# Patient Record
Sex: MALE | Race: WHITE | NOT HISPANIC OR LATINO | Employment: FULL TIME | URBAN - METROPOLITAN AREA
[De-identification: names, ages, dates, MRNs, and addresses within clinical notes are randomized per-mention and may not be internally consistent; named-entity substitution may affect disease eponyms.]

---

## 2023-06-23 ENCOUNTER — OFFICE VISIT (OUTPATIENT)
Dept: PRIMARY CARE | Facility: CLINIC | Age: 33
End: 2023-06-23
Payer: COMMERCIAL

## 2023-06-23 ENCOUNTER — LAB (OUTPATIENT)
Dept: LAB | Facility: LAB | Age: 33
End: 2023-06-23
Payer: COMMERCIAL

## 2023-06-23 VITALS
HEIGHT: 67 IN | SYSTOLIC BLOOD PRESSURE: 117 MMHG | HEART RATE: 74 BPM | WEIGHT: 177.5 LBS | OXYGEN SATURATION: 96 % | TEMPERATURE: 98.5 F | BODY MASS INDEX: 27.86 KG/M2 | RESPIRATION RATE: 16 BRPM | DIASTOLIC BLOOD PRESSURE: 76 MMHG

## 2023-06-23 DIAGNOSIS — Z13.220 LIPID SCREENING: ICD-10-CM

## 2023-06-23 DIAGNOSIS — Z00.00 ROUTINE GENERAL MEDICAL EXAMINATION AT A HEALTH CARE FACILITY: ICD-10-CM

## 2023-06-23 DIAGNOSIS — Z00.00 ROUTINE GENERAL MEDICAL EXAMINATION AT A HEALTH CARE FACILITY: Primary | ICD-10-CM

## 2023-06-23 DIAGNOSIS — R73.9 HYPERGLYCEMIA: Primary | ICD-10-CM

## 2023-06-23 PROBLEM — M99.02 SOMATIC DYSFUNCTION OF THORACIC REGION: Status: RESOLVED | Noted: 2023-06-23 | Resolved: 2023-06-23

## 2023-06-23 PROBLEM — J30.1 SEASONAL ALLERGIC RHINITIS DUE TO POLLEN: Status: ACTIVE | Noted: 2023-06-23

## 2023-06-23 PROBLEM — S81.811A LEG LACERATION, RIGHT, INITIAL ENCOUNTER: Status: RESOLVED | Noted: 2023-06-23 | Resolved: 2023-06-23

## 2023-06-23 PROBLEM — M99.07 SOMATIC DYSFUNCTION OF UPPER EXTREMITY: Status: RESOLVED | Noted: 2023-06-23 | Resolved: 2023-06-23

## 2023-06-23 PROBLEM — M99.05 SOMATIC DYSFUNCTION OF PELVIS REGION: Status: RESOLVED | Noted: 2023-06-23 | Resolved: 2023-06-23

## 2023-06-23 PROBLEM — S39.012A STRAIN OF LUMBAR PARASPINAL MUSCLE: Status: RESOLVED | Noted: 2023-06-23 | Resolved: 2023-06-23

## 2023-06-23 PROBLEM — M99.01 SOMATIC DYSFUNCTION OF CERVICAL REGION: Status: RESOLVED | Noted: 2023-06-23 | Resolved: 2023-06-23

## 2023-06-23 PROBLEM — M99.03 SOMATIC DYSFUNCTION OF LUMBAR REGION: Status: RESOLVED | Noted: 2023-06-23 | Resolved: 2023-06-23

## 2023-06-23 LAB
ALANINE AMINOTRANSFERASE (SGPT) (U/L) IN SER/PLAS: 25 U/L (ref 10–52)
ALBUMIN (G/DL) IN SER/PLAS: 4.9 G/DL (ref 3.4–5)
ALKALINE PHOSPHATASE (U/L) IN SER/PLAS: 53 U/L (ref 33–120)
ANION GAP IN SER/PLAS: 10 MMOL/L (ref 10–20)
ASPARTATE AMINOTRANSFERASE (SGOT) (U/L) IN SER/PLAS: 26 U/L (ref 9–39)
BILIRUBIN TOTAL (MG/DL) IN SER/PLAS: 0.4 MG/DL (ref 0–1.2)
CALCIUM (MG/DL) IN SER/PLAS: 9.8 MG/DL (ref 8.6–10.3)
CARBON DIOXIDE, TOTAL (MMOL/L) IN SER/PLAS: 31 MMOL/L (ref 21–32)
CHLORIDE (MMOL/L) IN SER/PLAS: 102 MMOL/L (ref 98–107)
CHOLESTEROL (MG/DL) IN SER/PLAS: 244 MG/DL (ref 0–199)
CHOLESTEROL IN HDL (MG/DL) IN SER/PLAS: 43.4 MG/DL
CHOLESTEROL/HDL RATIO: 5.6
CREATININE (MG/DL) IN SER/PLAS: 0.84 MG/DL (ref 0.5–1.3)
ERYTHROCYTE DISTRIBUTION WIDTH (RATIO) BY AUTOMATED COUNT: 11.6 % (ref 11.5–14.5)
ERYTHROCYTE MEAN CORPUSCULAR HEMOGLOBIN CONCENTRATION (G/DL) BY AUTOMATED: 33.8 G/DL (ref 32–36)
ERYTHROCYTE MEAN CORPUSCULAR VOLUME (FL) BY AUTOMATED COUNT: 88 FL (ref 80–100)
ERYTHROCYTES (10*6/UL) IN BLOOD BY AUTOMATED COUNT: 5.15 X10E12/L (ref 4.5–5.9)
GFR MALE: >90 ML/MIN/1.73M2
GLUCOSE (MG/DL) IN SER/PLAS: 102 MG/DL (ref 74–99)
HEMATOCRIT (%) IN BLOOD BY AUTOMATED COUNT: 45.5 % (ref 41–52)
HEMOGLOBIN (G/DL) IN BLOOD: 15.4 G/DL (ref 13.5–17.5)
LDL: 170 MG/DL (ref 0–99)
LEUKOCYTES (10*3/UL) IN BLOOD BY AUTOMATED COUNT: 6.4 X10E9/L (ref 4.4–11.3)
PLATELETS (10*3/UL) IN BLOOD AUTOMATED COUNT: 238 X10E9/L (ref 150–450)
POTASSIUM (MMOL/L) IN SER/PLAS: 3.9 MMOL/L (ref 3.5–5.3)
PROTEIN TOTAL: 7.3 G/DL (ref 6.4–8.2)
SODIUM (MMOL/L) IN SER/PLAS: 139 MMOL/L (ref 136–145)
TRIGLYCERIDE (MG/DL) IN SER/PLAS: 153 MG/DL (ref 0–149)
UREA NITROGEN (MG/DL) IN SER/PLAS: 10 MG/DL (ref 6–23)
VLDL: 31 MG/DL (ref 0–40)

## 2023-06-23 PROCEDURE — 80061 LIPID PANEL: CPT

## 2023-06-23 PROCEDURE — 1036F TOBACCO NON-USER: CPT | Performed by: STUDENT IN AN ORGANIZED HEALTH CARE EDUCATION/TRAINING PROGRAM

## 2023-06-23 PROCEDURE — 85027 COMPLETE CBC AUTOMATED: CPT

## 2023-06-23 PROCEDURE — 36415 COLL VENOUS BLD VENIPUNCTURE: CPT

## 2023-06-23 PROCEDURE — 99395 PREV VISIT EST AGE 18-39: CPT | Performed by: STUDENT IN AN ORGANIZED HEALTH CARE EDUCATION/TRAINING PROGRAM

## 2023-06-23 PROCEDURE — 80053 COMPREHEN METABOLIC PANEL: CPT

## 2023-06-23 SDOH — ECONOMIC STABILITY: FOOD INSECURITY: WITHIN THE PAST 12 MONTHS, YOU WORRIED THAT YOUR FOOD WOULD RUN OUT BEFORE YOU GOT MONEY TO BUY MORE.: NEVER TRUE

## 2023-06-23 SDOH — ECONOMIC STABILITY: HOUSING INSECURITY
IN THE LAST 12 MONTHS, WAS THERE A TIME WHEN YOU DID NOT HAVE A STEADY PLACE TO SLEEP OR SLEPT IN A SHELTER (INCLUDING NOW)?: NO

## 2023-06-23 SDOH — HEALTH STABILITY: PHYSICAL HEALTH: ON AVERAGE, HOW MANY MINUTES DO YOU ENGAGE IN EXERCISE AT THIS LEVEL?: 30 MIN

## 2023-06-23 SDOH — ECONOMIC STABILITY: FOOD INSECURITY: WITHIN THE PAST 12 MONTHS, THE FOOD YOU BOUGHT JUST DIDN'T LAST AND YOU DIDN'T HAVE MONEY TO GET MORE.: NEVER TRUE

## 2023-06-23 SDOH — ECONOMIC STABILITY: TRANSPORTATION INSECURITY
IN THE PAST 12 MONTHS, HAS THE LACK OF TRANSPORTATION KEPT YOU FROM MEDICAL APPOINTMENTS OR FROM GETTING MEDICATIONS?: NO

## 2023-06-23 SDOH — ECONOMIC STABILITY: INCOME INSECURITY: IN THE LAST 12 MONTHS, WAS THERE A TIME WHEN YOU WERE NOT ABLE TO PAY THE MORTGAGE OR RENT ON TIME?: NO

## 2023-06-23 SDOH — ECONOMIC STABILITY: TRANSPORTATION INSECURITY
IN THE PAST 12 MONTHS, HAS LACK OF TRANSPORTATION KEPT YOU FROM MEETINGS, WORK, OR FROM GETTING THINGS NEEDED FOR DAILY LIVING?: NO

## 2023-06-23 SDOH — HEALTH STABILITY: PHYSICAL HEALTH: ON AVERAGE, HOW MANY DAYS PER WEEK DO YOU ENGAGE IN MODERATE TO STRENUOUS EXERCISE (LIKE A BRISK WALK)?: 7 DAYS

## 2023-06-23 ASSESSMENT — SOCIAL DETERMINANTS OF HEALTH (SDOH)
IN THE PAST 12 MONTHS, HAS THE ELECTRIC, GAS, OIL, OR WATER COMPANY THREATENED TO SHUT OFF SERVICE IN YOUR HOME?: NO
HOW OFTEN DO YOU ATTEND CHURCH OR RELIGIOUS SERVICES?: NEVER
WITHIN THE LAST YEAR, HAVE TO BEEN RAPED OR FORCED TO HAVE ANY KIND OF SEXUAL ACTIVITY BY YOUR PARTNER OR EX-PARTNER?: NO
DO YOU BELONG TO ANY CLUBS OR ORGANIZATIONS SUCH AS CHURCH GROUPS UNIONS, FRATERNAL OR ATHLETIC GROUPS, OR SCHOOL GROUPS?: NO
IN A TYPICAL WEEK, HOW MANY TIMES DO YOU TALK ON THE PHONE WITH FAMILY, FRIENDS, OR NEIGHBORS?: MORE THAN THREE TIMES A WEEK
HOW HARD IS IT FOR YOU TO PAY FOR THE VERY BASICS LIKE FOOD, HOUSING, MEDICAL CARE, AND HEATING?: NOT HARD AT ALL
WITHIN THE LAST YEAR, HAVE YOU BEEN HUMILIATED OR EMOTIONALLY ABUSED IN OTHER WAYS BY YOUR PARTNER OR EX-PARTNER?: NO
WITHIN THE LAST YEAR, HAVE YOU BEEN KICKED, HIT, SLAPPED, OR OTHERWISE PHYSICALLY HURT BY YOUR PARTNER OR EX-PARTNER?: NO
WITHIN THE LAST YEAR, HAVE YOU BEEN AFRAID OF YOUR PARTNER OR EX-PARTNER?: NO
HOW OFTEN DO YOU GET TOGETHER WITH FRIENDS OR RELATIVES?: ONCE A WEEK
HOW OFTEN DO YOU ATTENT MEETINGS OF THE CLUB OR ORGANIZATION YOU BELONG TO?: NEVER

## 2023-06-23 ASSESSMENT — ENCOUNTER SYMPTOMS
FREQUENCY: 0
COUGH: 0
NERVOUS/ANXIOUS: 0
CONSTIPATION: 0
PALPITATIONS: 0
WHEEZING: 0
BACK PAIN: 0
UNEXPECTED WEIGHT CHANGE: 0
DYSURIA: 0
ABDOMINAL PAIN: 0
OCCASIONAL FEELINGS OF UNSTEADINESS: 0
DIARRHEA: 0
SHORTNESS OF BREATH: 0
SLEEP DISTURBANCE: 0
LOSS OF SENSATION IN FEET: 0
SORE THROAT: 0
RHINORRHEA: 0
NUMBNESS: 0
DEPRESSION: 0
EYE REDNESS: 0
FEVER: 0
DYSPHORIC MOOD: 0
WEAKNESS: 0
BLOOD IN STOOL: 0

## 2023-06-23 ASSESSMENT — LIFESTYLE VARIABLES
HOW MANY STANDARD DRINKS CONTAINING ALCOHOL DO YOU HAVE ON A TYPICAL DAY: 5 OR 6
HOW OFTEN DO YOU HAVE A DRINK CONTAINING ALCOHOL: 2-3 TIMES A WEEK

## 2023-06-23 ASSESSMENT — PATIENT HEALTH QUESTIONNAIRE - PHQ9
1. LITTLE INTEREST OR PLEASURE IN DOING THINGS: NOT AT ALL
2. FEELING DOWN, DEPRESSED OR HOPELESS: NOT AT ALL
SUM OF ALL RESPONSES TO PHQ9 QUESTIONS 1 & 2: 0

## 2023-06-23 NOTE — PROGRESS NOTES
I reviewed with the resident the medical history and the resident’s findings on physical examination.  I discussed with the resident the patient’s diagnosis and concur with the treatment plan as documented in the resident note.     Johnny Montano MD

## 2023-06-23 NOTE — PROGRESS NOTES
Subjective   Jayme Lackey is a 32 y.o. male who is here for a routine exam.  Active Problem List  None    Comprehensive Medical/Surgical/Social/Family History  Past Medical History:   Diagnosis Date    Childhood asthma     Leg laceration, right, initial encounter 06/23/2023    Personal history of other infectious and parasitic diseases     History of Clostridioides difficile colitis    Somatic dysfunction of cervical region 06/23/2023    Somatic dysfunction of lumbar region 06/23/2023    Somatic dysfunction of pelvis region 06/23/2023    Somatic dysfunction of thoracic region 06/23/2023    Somatic dysfunction of upper extremity 06/23/2023    Strain of lumbar paraspinal muscle 06/23/2023     Past Surgical History:   Procedure Laterality Date    OTHER SURGICAL HISTORY  10/18/2019    Rotator cuff repair    OTHER SURGICAL HISTORY  10/18/2019    Appendectomy    OTHER SURGICAL HISTORY  10/18/2019    Colonoscopy    OTHER SURGICAL HISTORY  10/18/2019    Hernia repair     Social History     Social History Narrative    Not on file   Drinks 5-10 alcoholic drinks per week, but all one day when golfing on the weekend. No adverse outcomes from drinking alcohol, does not drive after drinking. Has not attempted to cut back.       Allergies and Medications  Penicillins  No current outpatient medications on file prior to visit.     No current facility-administered medications on file prior to visit.       Cancer Screening:  Colon Cancer Screening: not indicated  Prostate Cancer Screening: not indicated    Immunizations: Up to Date. Tdap in 2021.    Diet: Tries to control portions. Eats all major food groups. Has cut back on cookies. Has 20oz of sweetened coffee per day.   Exercise: Plays golf every weekend. Rides bike/rows for 30 minutes per day. Does bodyweight exercises.  Eyes: Sees eye doctor once per year.  Dentist: Sees dentist twice per year. Brushes twice per day and flosses once per day.    New concerns:  None    Review  "of Systems   Constitutional:  Negative for fever and unexpected weight change.   HENT:  Negative for rhinorrhea and sore throat.    Eyes:  Negative for redness and visual disturbance.   Respiratory:  Negative for cough, shortness of breath and wheezing.    Cardiovascular:  Negative for chest pain and palpitations.   Gastrointestinal:  Negative for abdominal pain, blood in stool, constipation and diarrhea.   Genitourinary:  Negative for dysuria and frequency.   Musculoskeletal:  Negative for back pain.   Neurological:  Negative for weakness and numbness.   Psychiatric/Behavioral:  Negative for dysphoric mood, sleep disturbance and suicidal ideas. The patient is not nervous/anxious.        Objective   /76 (BP Location: Right arm, Patient Position: Sitting)   Pulse 74   Temp 36.9 °C (98.5 °F) (Temporal)   Resp 16   Ht 1.702 m (5' 7\")   Wt 80.5 kg (177 lb 8 oz)   SpO2 96%   BMI 27.80 kg/m²   Constitutional: Well developed, awake, alert, oriented x3  Head and Face: NCAT  Eyes: Normal external exam, EOMI  ENT: Normal external inspection of ears and nose. TMs clear b/l. Oropharynx normal.  Neck: No cervical LAD. No thyroid nodules or tenderness.  Cardiovascular: RRR, S1/S2, no murmurs, rubs, or gallops, radial pulses +2, no edema of extremities  Pulmonary: CTAB, no respiratory distress.  Abdomen: +BS, soft, non-tender, nondistended, no guarding or rebound, no masses noted  MSK: No joint swelling, normal movements of all extremities. Range of motion- normal.  Skin: No lesions, contusions, or erythema.  Psychiatric: Judgment intact. Appropriate mood and behavior    Diagnoses and all orders for this visit:  Routine general medical examination at a health care facility  -     CBC; Future  -     Comprehensive Metabolic Panel; Future  -     Lipid Panel; Future  Lipid screening  -     Lipid Panel; Future    Reviewed Social Determinants of health with patient, discussed healthy lifestyle including 150 minutes of " physical activity per week.  Weekly alcohol intake falls within acceptable limit, but intake on single day falls within binging criteria. Recommend 4 or fewer alcoholic beverages in a single sitting.   Ordered/Reviewed routine labs  Immunizations Up-to-Date

## 2025-06-09 ASSESSMENT — PROMIS GLOBAL HEALTH SCALE
EMOTIONAL_PROBLEMS: NEVER
RATE_GENERAL_HEALTH: GOOD
CARRYOUT_SOCIAL_ACTIVITIES: EXCELLENT
RATE_MENTAL_HEALTH: GOOD
RATE_QUALITY_OF_LIFE: GOOD
RATE_AVERAGE_FATIGUE: MILD
RATE_PHYSICAL_HEALTH: FAIR
RATE_AVERAGE_PAIN: 3
RATE_SOCIAL_SATISFACTION: GOOD
CARRYOUT_PHYSICAL_ACTIVITIES: COMPLETELY

## 2025-06-16 ENCOUNTER — HOSPITAL ENCOUNTER (OUTPATIENT)
Dept: RADIOLOGY | Facility: CLINIC | Age: 35
Discharge: HOME | End: 2025-06-16
Payer: COMMERCIAL

## 2025-06-16 ENCOUNTER — APPOINTMENT (OUTPATIENT)
Dept: PRIMARY CARE | Facility: CLINIC | Age: 35
End: 2025-06-16
Payer: COMMERCIAL

## 2025-06-16 VITALS
TEMPERATURE: 98.2 F | HEART RATE: 92 BPM | SYSTOLIC BLOOD PRESSURE: 116 MMHG | RESPIRATION RATE: 16 BRPM | OXYGEN SATURATION: 98 % | WEIGHT: 186 LBS | HEIGHT: 68 IN | DIASTOLIC BLOOD PRESSURE: 80 MMHG | BODY MASS INDEX: 28.19 KG/M2

## 2025-06-16 DIAGNOSIS — G54.2 CERVICAL NERVE ROOT IMPINGEMENT: ICD-10-CM

## 2025-06-16 DIAGNOSIS — Z00.00 ANNUAL PHYSICAL EXAM: Primary | ICD-10-CM

## 2025-06-16 PROCEDURE — 3008F BODY MASS INDEX DOCD: CPT

## 2025-06-16 PROCEDURE — 72040 X-RAY EXAM NECK SPINE 2-3 VW: CPT | Performed by: RADIOLOGY

## 2025-06-16 PROCEDURE — 72040 X-RAY EXAM NECK SPINE 2-3 VW: CPT

## 2025-06-16 PROCEDURE — 99213 OFFICE O/P EST LOW 20 MIN: CPT

## 2025-06-16 PROCEDURE — 99395 PREV VISIT EST AGE 18-39: CPT

## 2025-06-16 PROCEDURE — 1036F TOBACCO NON-USER: CPT

## 2025-06-16 RX ORDER — METHOCARBAMOL 500 MG/1
500 TABLET, FILM COATED ORAL 4 TIMES DAILY PRN
Qty: 40 TABLET | Refills: 0 | Status: SHIPPED | OUTPATIENT
Start: 2025-06-16 | End: 2025-08-15

## 2025-06-16 SDOH — ECONOMIC STABILITY: FOOD INSECURITY: WITHIN THE PAST 12 MONTHS, YOU WORRIED THAT YOUR FOOD WOULD RUN OUT BEFORE YOU GOT MONEY TO BUY MORE.: NEVER TRUE

## 2025-06-16 SDOH — HEALTH STABILITY: PHYSICAL HEALTH: ON AVERAGE, HOW MANY MINUTES DO YOU ENGAGE IN EXERCISE AT THIS LEVEL?: 60 MIN

## 2025-06-16 SDOH — HEALTH STABILITY: PHYSICAL HEALTH: ON AVERAGE, HOW MANY DAYS PER WEEK DO YOU ENGAGE IN MODERATE TO STRENUOUS EXERCISE (LIKE A BRISK WALK)?: 7 DAYS

## 2025-06-16 SDOH — ECONOMIC STABILITY: INCOME INSECURITY: IN THE LAST 12 MONTHS, WAS THERE A TIME WHEN YOU WERE NOT ABLE TO PAY THE MORTGAGE OR RENT ON TIME?: NO

## 2025-06-16 ASSESSMENT — SOCIAL DETERMINANTS OF HEALTH (SDOH)
DO YOU BELONG TO ANY CLUBS OR ORGANIZATIONS SUCH AS CHURCH GROUPS UNIONS, FRATERNAL OR ATHLETIC GROUPS, OR SCHOOL GROUPS?: NO
IN A TYPICAL WEEK, HOW MANY TIMES DO YOU TALK ON THE PHONE WITH FAMILY, FRIENDS, OR NEIGHBORS?: MORE THAN THREE TIMES A WEEK
HOW HARD IS IT FOR YOU TO PAY FOR THE VERY BASICS LIKE FOOD, HOUSING, MEDICAL CARE, AND HEATING?: NOT HARD AT ALL
WITHIN THE LAST YEAR, HAVE YOU BEEN KICKED, HIT, SLAPPED, OR OTHERWISE PHYSICALLY HURT BY YOUR PARTNER OR EX-PARTNER?: NO
WITHIN THE LAST YEAR, HAVE TO BEEN RAPED OR FORCED TO HAVE ANY KIND OF SEXUAL ACTIVITY BY YOUR PARTNER OR EX-PARTNER?: NO
WITHIN THE LAST YEAR, HAVE YOU BEEN AFRAID OF YOUR PARTNER OR EX-PARTNER?: NO
HOW OFTEN DO YOU ATTENT MEETINGS OF THE CLUB OR ORGANIZATION YOU BELONG TO?: NEVER
HOW OFTEN DO YOU GET TOGETHER WITH FRIENDS OR RELATIVES?: ONCE A WEEK
WITHIN THE LAST YEAR, HAVE YOU BEEN HUMILIATED OR EMOTIONALLY ABUSED IN OTHER WAYS BY YOUR PARTNER OR EX-PARTNER?: NO
HOW OFTEN DO YOU ATTEND CHURCH OR RELIGIOUS SERVICES?: NEVER
IN THE PAST 12 MONTHS, HAS THE ELECTRIC, GAS, OIL, OR WATER COMPANY THREATENED TO SHUT OFF SERVICE IN YOUR HOME?: NO

## 2025-06-16 ASSESSMENT — ENCOUNTER SYMPTOMS
NECK PAIN: 1
NAUSEA: 0
FEVER: 0
LIGHT-HEADEDNESS: 0
SHORTNESS OF BREATH: 0
VOMITING: 0
NUMBNESS: 1
DIZZINESS: 0

## 2025-06-16 ASSESSMENT — PATIENT HEALTH QUESTIONNAIRE - PHQ9
2. FEELING DOWN, DEPRESSED OR HOPELESS: NOT AT ALL
SUM OF ALL RESPONSES TO PHQ9 QUESTIONS 1 & 2: 0
1. LITTLE INTEREST OR PLEASURE IN DOING THINGS: NOT AT ALL

## 2025-06-16 ASSESSMENT — LIFESTYLE VARIABLES
HOW OFTEN DO YOU HAVE A DRINK CONTAINING ALCOHOL: 2-3 TIMES A WEEK
HOW MANY STANDARD DRINKS CONTAINING ALCOHOL DO YOU HAVE ON A TYPICAL DAY: 3 OR 4
AUDIT-C TOTAL SCORE: 5
HOW OFTEN DO YOU HAVE SIX OR MORE DRINKS ON ONE OCCASION: LESS THAN MONTHLY
SKIP TO QUESTIONS 9-10: 0

## 2025-06-16 NOTE — PROGRESS NOTES
I reviewed the resident/fellow's documentation and discussed the patient with the resident. I agree with the resident medical decision making as documented in the note.   Patient's symptoms have been going on for close to a month.  It has not changed much.  Will get an x-ray.  Patient likely will need MRI, EMG ordered to see as a spine specialist.  Patient states it does occur, otherwise he is doing fine, no chest pain or short of breath.  Patient aware if these changes any worsening symptoms, any weakness, chest pain shortness of breath headache numbness increases to go to ER.  Patient aware also how important it is to ensure this is follow-up plans so we do not have any long-term dysfunction/disability  Follow-up in 2 to 4 weeks  Agree with assessment and plan  Mikhail Anguiano, DO

## 2025-06-16 NOTE — PROGRESS NOTES
Subjective   Jayme Lackey is a 34 y.o. male who presents for Annual Exam (Numbness tingling in right arm).  Pt presenting for an annual physical. He does have concern of numbness/tingling going down Left arm from his neck. He did stretch 8 months ago and felt a pop and had immediate stiffness with numbness/tingling. It has been consistent for months and he feels it with holding his head up. He does see a chiropractor monthly and it does help him with his lower back problems. No other issues at this time    Sochx: drinks socially, denies smoking/ID use; works as a ; sexually active in a monogamous relationship; physically active daily at least 30minutes a couple hours of working outside; diet is not the best due to travel but is okay to fair; sleeps 8 hours a night at least; does go to dentist twice per year  Famhx: paternal grandmother bone marrow cancer; aunt had heart issues  Allergies: penicillins (hives)            Medications Ordered Prior to Encounter[1]    Review of Systems   Constitutional:  Negative for fever.   Respiratory:  Negative for shortness of breath.    Cardiovascular:  Negative for chest pain.   Gastrointestinal:  Negative for nausea and vomiting.   Musculoskeletal:  Positive for neck pain.   Neurological:  Positive for numbness. Negative for dizziness and light-headedness.   All other systems reviewed and are negative.      Vitals:    06/16/25 0911   BP: 116/80   Pulse: 92   Resp: 16   Temp: 36.8 °C (98.2 °F)   SpO2: 98%     Objective   Physical Exam  Vitals reviewed.   Constitutional:       General: He is not in acute distress.     Appearance: Normal appearance. He is not toxic-appearing.   HENT:      Head: Normocephalic and atraumatic.      Nose: Nose normal.   Eyes:      Extraocular Movements: Extraocular movements intact.   Cardiovascular:      Rate and Rhythm: Normal rate and regular rhythm.      Heart sounds: No murmur heard.     No friction rub. No gallop.   Pulmonary:       Effort: Pulmonary effort is normal. No respiratory distress.      Breath sounds: Normal breath sounds. No wheezing, rhonchi or rales.   Musculoskeletal:      Comments: Spurlings positive going down L arm    Skin:     General: Skin is warm and dry.   Neurological:      General: No focal deficit present.      Mental Status: He is alert.      Comments: Tinel's neg at wrist and elbow, phalen's neg   Psychiatric:         Mood and Affect: Mood normal.         Behavior: Behavior normal.         Assessment & Plan  Cervical nerve root impingement  Pt presenting for L sided first three UE digits numbness/tingling. His symptoms started 8 months ago when he stretched and felt a pop. He does go to chiropractor which does help his lower back pain. He notices his symptoms worsen when he extends his neck. Spurling was positive in exam room today, there is concern for cervical nerve root impingement. Will put in muscle relaxer to take as prescribed; NOT to drive while taking. Will also order xray and PT, if needed can do CT scan and ortho referral. Pt can follow up in 1 month after starting PT.  Orders:    methocarbamol (Robaxin) 500 mg tablet; Take 1 tablet (500 mg) by mouth 4 times a day as needed for muscle spasms.    Referral to Physical Therapy; Future    XR cervical spine 2-3 views; Future    Annual physical exam  Today's recommendations:     Immunizations: Immunizations are up to date.  Nutrition guidance: healthy eating, eating a balanced diet and encouraging proper nutrition.   Physical activity and lifestyle: participating in physical activities 150 mins weekly, sleep 8 hours per night and dental visits twice a year  Safety/Risk reduction guidelines: avoidance of tobacco, alcohol and drugs and substance use  Screenings: Labwork ordered, imaging and screenings not indicated  Orders:    CBC and Auto Differential; Future    Comprehensive metabolic panel; Future    Lipid panel; Future    Vitamin B12; Future    Folate;  Future    Tsh With Reflex To Free T4 If Abnormal; Future    Hepatitis C antibody; Future              Jairo Beverly DO        [1]   No current outpatient medications on file prior to visit.     No current facility-administered medications on file prior to visit.

## 2025-06-17 NOTE — PROGRESS NOTES
Physical Therapy Evaluation and Treatment    Patient Name: Jayme Lackey  MRN: [id number]  Today's Date: 6/18/2025  Time Calculation  Start Time: 1135  Stop Time: 1227  Time Calculation (min): 52 min    Insurance: [facility]  -AUTH REQ within 2 bus days  -20% coins  -$2,500 DED  -$3,500 OOP max    Visit Number: 1    Assessment:  Patient is a 34 y.o. male who presents to PT with reports of distal L numbness/tingling and neck pain ongoing for ~1 year. Recently noticed additional numbness/tingling in the R hand for ~1 month. Impairments found on exam include myotomal weakness at C8, impaired recruitment and endurance of DNF, impaired L cervical AROM, static postural impairments, and positive ULTT for median nerve involvement. Repeated movements positive w/ reduction in symptoms after repeated cervical retraction. Signs and symptoms are consistent w/ cervical radiculopathy secondary to nerve root impingement at C4-C7 levels. Also likely secondary brachial plexus involvement given reproduction of distal neurologic symptoms w/ isolated palpation of pec minor, scalenes, and biceps. These symptoms are limiting patient's ability to perform pain-free and full cervical AROM and shoulder AROM which is limiting his participation in work-related activities and driving. The impairments and functional limitations outlined above indicate a need for skilled PT services to assist the patient in returning to his prior level of function.    Standardized testing and measures administered today reveal that the patient has 6 impairments in body structures and functions, activity limitations, and participation restrictions. The patient has no personal factors and comorbidities that may serve as barriers affecting the plan of care. The patient's clinical presentation includes stable characteristics as noted during today's evaluation, & these findings indicate that this patient is of low complexity.     Plan:  Treatment/Interventions: Dry  "needling, Education/ Instruction, Manual therapy, Neuromuscular re-education, Self care/ home management, Therapeutic activities, Therapeutic exercises  PT Plan: Skilled PT  PT Frequency: 1 time per week  Duration: 8 visits  Onset Date: 06/18/24  Number of Treatments Authorized: AUTH REQ  Rehab Potential: Excellent  Plan of Care Agreement: Patient    Next Visit:  -Postural training: six-pack scap, DNF training  -Manual: R cervical UPA/down glides  -Education regarding work station set-up and moving into cervical opening positions    Current Problem:   1. Cervicalgia        2. Cervical nerve root impingement  Referral to Physical Therapy    Follow Up In Physical Therapy          Subjective    Patient presents to the clinic ambulatory w/out device and w/ reports of chronic L shoulder blade pain that began ~1 year ago after he was stretching and felt a pop in the upper part of his shoulder blade with immediate N/T that radiated down into his first three digits, both palmar and dorsal side. Pain transitioned to R hand in same nerve distribution w/out traumatic incident. N/T is not constant, fluctuates depending on the activity. Of note, immediate N/T when looking to the L, not immediate symptoms when looking to the R, but will still feel N/T down L hand mildly. Neck/shoulder pain currently rated 6/10, worst pain rated 8-9/10. Aggravating factors include looking back and forth between monitors at work and driving. Patient is also seeing chiropractor 1x/month w/ a few hours of relief, but with return of symptoms. Alleviating factors include TENS unit.     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     Per referring provider (PCP, 6/16/2025), \"Pt presenting for L sided first three UE digits numbness/tingling. His symptoms started 8 months ago when he stretched and felt a pop. He does go to chiropractor which does help his lower back pain. He " "notices his symptoms worsen when he extends his neck. Spurling was positive in exam room today, there is concern for cervical nerve root impingement. Will put in muscle relaxer to take as prescribed; NOT to drive while taking. Will also order xray and PT, if needed can do CT scan and ortho referral. Pt can follow up in 1 month after starting PT.     Patient's symptoms have been going on for close to a month.  It has not changed much.  Will get an x-ray.  Patient likely will need MRI, EMG ordered to see as a spine specialist.  Patient states it does occur, otherwise he is doing fine, no chest pain or short of breath.  Patient aware if these changes any worsening symptoms, any weakness, chest pain shortness of breath headache numbness increases to go to ER.  Patient aware also how important it is to ensure this is follow-up plans so we do not have any long-term dysfunction/disability. Follow-up in 2 to 4 weeks.    Relevant imaging (X-ray, 6/16/2025) reveals mild C5-C6 and C6-C7 spondylosis and reversal of normal cervical lordosis which can be associated with patient positioning or muscle spasm.\"    Current Functional Limitations: Work, driving    Patient-Stated Goals: \"Cure issue.\"    Relevant PMH:  -None on file    Home Living:   Reviewed and no concern.    Objective   Observation  -Sitting posture: mild forward head/rounded shoulders, incr thoracic kyphosis    Shoulder Screen  -AROM: WNL all directions   -Overhead: (R) T4, (L) T4, discomfort felt at inferolateral angle of scapula  -Hand up back: (R) T7, (L) T7    Cervical Screening  -Compression/Distraction:   -Transverse Ligament: (-)  -Alar Ligament: (-) B    Cervical AROM  -Flexion: 38, tightness/pain located along L LS   -Extension: 35   -Lat flexion: (R) 25, (L) 20   -Rotation: (R) 66, (L) 50    Cervical Strength  -Flexion: 5/5  -Extension: 5/5  -Lat flexion: (R) 5/5, (L) 4+/5  -Rotation: (R) 5/5, (L) 4+/5  -DNF: 15s, no compensation, muscle fasciculations " noted near end     Cervical Myotomes  -Shoulder Elevation (C4): 5/5 B  -Shoulder ABD (C5): 5/5 B  -Elbow Flexion (C6): 5/5 B  -Wrist Extension (C6): 5/5 B  -Elbow Extension (C7): 5/5 B  -Wrist Flexion (C7): 5/5 B  -Thumb Extension (C8): (R) 5/5, (L) 4/5  -Finger ABD (T1): 5/5 B    Joint Mobility  -C0-C1: normal  -C2-C7: hypomobile grade II   -T-spine: hypomobile grade II    Palpation  -TTP: (L) anterior scalenes, UT, pec minor, and biceps belly reproduced symptoms in hand, no other tenderness noted    Special Tests:  -Repeated Movements: extension (+) improved symptoms in hand, UE, and neck   -ULTT 1: (+)   -ULTT 3: (-)    Outcome Measures:  Other Measures  Neck Disability Index: 7    Treatments:  -Established HEP, patient performed several repetitions of each exercise to allow for assessment of performance accuracy  -Education, see section below    Access Code: [email or web address]  URL: [email or web address]  Date: 06/18/2025  Prepared by: Judith Abdi    Exercises  - Doorway Pec Stretch at 60 Elevation  - 1 x daily - 7 x weekly - 3 sets - 30s hold  - Seated Cervical Retraction  - 1 x daily - 7 x weekly - 3 sets - 10 reps - 10s hold  - Standing Shoulder Row with Anchored Resistance  - 1 x daily - 7 x weekly - 3 sets - 10 reps - 3s hold  - Prone W Scapular Retraction  - 1 x daily - 7 x weekly - 3 sets - 10 reps - 5s hold  - Standing Median Nerve Glide  - 1 x daily - 7 x weekly - 3 sets - 10 reps    EDUCATION:   Provided education on rationale behind PT diagnosis/prognosis, HEP, and PT POC. Heavy education regarding relevant anatomy in the context of ongoing symptoms and impairments, utilized spinal model and virtual anatomy kailey to enhance understanding. Patient verbalized understanding.    Goals:  By discharge,     1.) Patient will demonstrate independence with HEP to promote symptom relief and facilitate return to prior level of function.  2.) Patient will report decrease in pain by 2 points to meet  the MCID.  3.) Score on NDI will improve to >7.5 points to meet the MCID and indicate return to typical function relative to improved neck pain.   4.) Patient will demonstrate ability to perform DNF endurance test for >30s w/out compensation to promote muscular stability necessary for pain relief and return to PLOF.  5.) Patient will demonstrate improvement in cervical L LAT flexion and rotation AROM to within 5 deg of unaffected side to facilitate mobility necessary for I/ADLs, mobility tasks, pain relief.   6.) Patient will demonstrate pain-free shoulder mobility in all directions to facilitate mobility necessary for I/ADL and work-related tasks.   7.) Patient will report a reduction in hand tingling frequency and intensity by >75% to facilitate return to PLOF.

## 2025-06-18 ENCOUNTER — EVALUATION (OUTPATIENT)
Dept: PHYSICAL THERAPY | Facility: CLINIC | Age: 35
End: 2025-06-18
Payer: COMMERCIAL

## 2025-06-18 DIAGNOSIS — G54.2 CERVICAL NERVE ROOT IMPINGEMENT: ICD-10-CM

## 2025-06-18 DIAGNOSIS — M54.2 CERVICALGIA: Primary | ICD-10-CM

## 2025-06-18 PROBLEM — M54.12 CERVICAL RADICULOPATHY: Status: ACTIVE | Noted: 2025-06-18

## 2025-06-18 PROCEDURE — 97161 PT EVAL LOW COMPLEX 20 MIN: CPT | Mod: GP | Performed by: PHYSICAL THERAPIST

## 2025-06-19 LAB
ALBUMIN SERPL-MCNC: 4.6 G/DL (ref 3.6–5.1)
ALP SERPL-CCNC: 73 U/L (ref 36–130)
ALT SERPL-CCNC: 32 U/L (ref 9–46)
ANION GAP SERPL CALCULATED.4IONS-SCNC: 11 MMOL/L (CALC) (ref 7–17)
AST SERPL-CCNC: 25 U/L (ref 10–40)
BASOPHILS # BLD AUTO: 22 CELLS/UL (ref 0–200)
BASOPHILS NFR BLD AUTO: 0.4 %
BILIRUB SERPL-MCNC: 0.6 MG/DL (ref 0.2–1.2)
BUN SERPL-MCNC: 11 MG/DL (ref 7–25)
CALCIUM SERPL-MCNC: 9.4 MG/DL (ref 8.6–10.3)
CHLORIDE SERPL-SCNC: 103 MMOL/L (ref 98–110)
CHOLEST SERPL-MCNC: 240 MG/DL
CHOLEST/HDLC SERPL: 5.5 (CALC)
CO2 SERPL-SCNC: 26 MMOL/L (ref 20–32)
CREAT SERPL-MCNC: 0.8 MG/DL (ref 0.6–1.26)
EGFRCR SERPLBLD CKD-EPI 2021: 118 ML/MIN/1.73M2
EOSINOPHIL # BLD AUTO: 11 CELLS/UL (ref 15–500)
EOSINOPHIL NFR BLD AUTO: 0.2 %
ERYTHROCYTE [DISTWIDTH] IN BLOOD BY AUTOMATED COUNT: 12.1 % (ref 11–15)
FOLATE SERPL-MCNC: 11.6 NG/ML
GLUCOSE SERPL-MCNC: 110 MG/DL (ref 65–139)
HCT VFR BLD AUTO: 48.1 % (ref 38.5–50)
HCV AB SERPL QL IA: NORMAL
HDLC SERPL-MCNC: 44 MG/DL
HGB BLD-MCNC: 16 G/DL (ref 13.2–17.1)
LDLC SERPL CALC-MCNC: 166 MG/DL (CALC)
LYMPHOCYTES # BLD AUTO: 2178 CELLS/UL (ref 850–3900)
LYMPHOCYTES NFR BLD AUTO: 39.6 %
MCH RBC QN AUTO: 29.1 PG (ref 27–33)
MCHC RBC AUTO-ENTMCNC: 33.3 G/DL (ref 32–36)
MCV RBC AUTO: 87.6 FL (ref 80–100)
MONOCYTES # BLD AUTO: 457 CELLS/UL (ref 200–950)
MONOCYTES NFR BLD AUTO: 8.3 %
NEUTROPHILS # BLD AUTO: 2833 CELLS/UL (ref 1500–7800)
NEUTROPHILS NFR BLD AUTO: 51.5 %
NONHDLC SERPL-MCNC: 196 MG/DL (CALC)
PLATELET # BLD AUTO: 255 THOUSAND/UL (ref 140–400)
PMV BLD REES-ECKER: 10.4 FL (ref 7.5–12.5)
POTASSIUM SERPL-SCNC: 4 MMOL/L (ref 3.5–5.3)
PROT SERPL-MCNC: 7.2 G/DL (ref 6.1–8.1)
RBC # BLD AUTO: 5.49 MILLION/UL (ref 4.2–5.8)
SODIUM SERPL-SCNC: 140 MMOL/L (ref 135–146)
TRIGL SERPL-MCNC: 151 MG/DL
TSH SERPL-ACNC: 1.43 MIU/L (ref 0.4–4.5)
VIT B12 SERPL-MCNC: 500 PG/ML (ref 200–1100)
WBC # BLD AUTO: 5.5 THOUSAND/UL (ref 3.8–10.8)

## 2025-06-26 ENCOUNTER — TREATMENT (OUTPATIENT)
Dept: PHYSICAL THERAPY | Facility: CLINIC | Age: 35
End: 2025-06-26
Payer: COMMERCIAL

## 2025-06-26 DIAGNOSIS — G54.2 CERVICAL NERVE ROOT IMPINGEMENT: ICD-10-CM

## 2025-06-26 DIAGNOSIS — M54.12 CERVICAL RADICULOPATHY: ICD-10-CM

## 2025-06-26 DIAGNOSIS — M54.2 CERVICALGIA: Primary | ICD-10-CM

## 2025-06-26 PROCEDURE — 97110 THERAPEUTIC EXERCISES: CPT | Mod: GP | Performed by: PHYSICAL THERAPIST

## 2025-06-26 NOTE — PROGRESS NOTES
Physical Therapy    Physical Therapy Treatment    Patient Name: Jayme Lackey  MRN: [id number]  Today's Date: 6/26/2025    Time Entry:   Time Calculation  Start Time: 1746  Stop Time: 1815  Time Calculation (min): 29 min     PT Therapeutic Procedures Time Entry  Therapeutic Exercise Time Entry: 27                   Insurance: [facility]  -AUTH REQ within 2 bus days  -20% coins  -$2,500 DED  -$3,500 OOP max     Visit Number: 2 (1/5)  -Authorized Visits: 5  -Authorized Date Range: 6/18/2025 - 8/16/2025    Assessment:   Session focused on progressing postural endurance and DNF activation. Patient tolerated well and demonstrated proper form w/ all interventions, but did require minor cueing for cervical retraction vs forward flexion. Reported symptomatic improvements indicate success of PT interventions thus far and appropriate working PT diagnosis. Will continue to benefit from ongoing training in postural strength and control to reduce central and peripheral tension on nerves. At this time, patient continues to require skilled PT services to address ongoing impairments in posture and DNF activation to promote return to PLOF.     Plan:   Continue with POC as tolerated.     Next Vist:  -Postural training: six-pack scap, DNF training  -Education regarding work station set-up and moving into cervical opening positions    Current Problem  1. Cervicalgia        2. Cervical nerve root impingement  Follow Up In Physical Therapy      3. Cervical radiculopathy            Subjective    General   Patient presents to the clinic w/ reports of improvement of symptoms. Patient endorses that frequency and intensity have reduced since IE, w/ worst symptoms rated 6/10 vs 8-9/10 as seen previously. Able to use chin tucks as movement medicine which immediately reduces symptoms. Has been re-initiating working out w/out issues.     Objective     Treatments:  Therapeutic Exercise (74522): 27 minutes  To improve cervicothoracic  mobility:  -Seated thoracic extension over chair w/ arms crossed across chest, x 15  -Seated cervical flexion/R rotation AROM w/ patient-driven OP, 15 x 2s     To improve DNF endurance and postural control:  -Quadruped chin tucks, 12 x 2s holds  -Quadruped chin tucks + alt UE raise, x 12 R/L  -Wall angels + chin tuck w/ towel behind T-spine, 2 x 10   -Standing SA slides w/ ER tension + lift-off, x 12  -Standing B UE ER w/ red TB, x 15     Current HEP:  Access Code: [email or web address]  URL: [email or web address]  Date: 06/18/2025  Prepared by: Judith Abdi     Exercises  - Doorway Pec Stretch at 60 Elevation  - 1 x daily - 7 x weekly - 3 sets - 30s hold  - Seated Cervical Retraction  - 1 x daily - 7 x weekly - 3 sets - 10 reps - 10s hold  - Standing Shoulder Row with Anchored Resistance  - 1 x daily - 7 x weekly - 3 sets - 10 reps - 3s hold  - Prone W Scapular Retraction  - 1 x daily - 7 x weekly - 3 sets - 10 reps - 5s hold  - Standing Median Nerve Glide  - 1 x daily - 7 x weekly - 3 sets - 10 reps    OP EDUCATION:   Provided education regarding rationale behind interventions performed in the context of ongoing symptoms. Patient verbalized understanding.    Goals:  By discharge,      1.) Patient will demonstrate independence with HEP to promote symptom relief and facilitate return to prior level of function.  2.) Patient will report decrease in pain by 2 points to meet the MCID.  3.) Score on NDI will improve to >7.5 points to meet the MCID and indicate return to typical function relative to improved neck pain.   4.) Patient will demonstrate ability to perform DNF endurance test for >30s w/out compensation to promote muscular stability necessary for pain relief and return to PLOF.  5.) Patient will demonstrate improvement in cervical L LAT flexion and rotation AROM to within 5 deg of unaffected side to facilitate mobility necessary for I/ADLs, mobility tasks, pain relief.   6.) Patient will demonstrate  pain-free shoulder mobility in all directions to facilitate mobility necessary for I/ADL and work-related tasks.   7.) Patient will report a reduction in hand tingling frequency and intensity by >75% to facilitate return to PLOF.

## 2025-07-01 ENCOUNTER — TREATMENT (OUTPATIENT)
Dept: PHYSICAL THERAPY | Facility: CLINIC | Age: 35
End: 2025-07-01
Payer: COMMERCIAL

## 2025-07-01 DIAGNOSIS — M54.12 CERVICAL RADICULOPATHY: ICD-10-CM

## 2025-07-01 DIAGNOSIS — M54.2 CERVICALGIA: Primary | ICD-10-CM

## 2025-07-01 DIAGNOSIS — G54.2 CERVICAL NERVE ROOT IMPINGEMENT: ICD-10-CM

## 2025-07-01 PROCEDURE — 97110 THERAPEUTIC EXERCISES: CPT | Mod: GP | Performed by: PHYSICAL THERAPIST

## 2025-07-01 PROCEDURE — 97140 MANUAL THERAPY 1/> REGIONS: CPT | Mod: GP | Performed by: PHYSICAL THERAPIST

## 2025-07-01 NOTE — PROGRESS NOTES
Physical Therapy    Physical Therapy Treatment    Patient Name: Jayme Lackey  MRN: [id number]  Today's Date: 7/1/2025    Time Entry:   Time Calculation  Start Time: 1450  Stop Time: 1530  Time Calculation (min): 40 min     PT Therapeutic Procedures Time Entry  Therapeutic Exercise Time Entry: 38                   Insurance: [employer] [employer]  -AUTH REQ within 2 bus days  -20% coins  -$2,500 DED  -$3,500 OOP max     Visit Number: 3 (2/5)  -Authorized Visits: 5  -Authorized Date Range: 6/18/2025 - 8/16/2025    Assessment:   Session focused on progressing soft tissue extensibility at teres major d/t potential for nerve impingement/postural impairments as well as strengthening posterior chain to assist in more erect thoracic positioning. Patient tolerated well w/ appropriate form w/ all interventions despite novelty of some. Discussed standing desk at work to further reduce symptoms and to relieve tension on radial nerve. Patient to check back in in 2-3 weeks to assess progress. At this time, patient continues to require skilled PT services to address ongoing impairments in posture and DNF activation to promote return to PLOF.     Plan:   Continue with POC as tolerated.     Next Vist:  -Postural training: six-pack scap, DNF training  -Education regarding work station set-up and moving into cervical opening positions    Current Problem  1. Cervicalgia        2. Cervical nerve root impingement  Follow Up In Physical Therapy      3. Cervical radiculopathy            Subjective    General   Patient presents to the clinic w/ reports of improved neck symptoms, but now having numbness/tingling at inferolateral angle of scapula. Pain begins at this region and then begins to travel down into familiar area of hand, although now more centralized on dorsum of L thumb. Patient reports that performing quadruped chin tuck and arm raise assists in reducing scapular pain and N/T.     Objective     Treatments:  Therapeutic Exercise (54572):  30 minutes  To improve DNF endurance and postural control:  -Standing B UE ER w/ green TB + chin tuck, 15 x 2s holds    To improve postural strength:  -Six-pack scap program:   -Prone T (palm down), 6 x 6s holds   -Prone T (thumb up), 6 x 6s holds   -Prone Y (palm down), 6 x 6s holds   -Prone Y (thumb up), 6 x 6s holds    -Prone goal-post + thumbs back, 6 x 6s holds   -Prone cobras, 6 x 6s holds    -Education: Provided education regarding relevant anatomy in the context of new and ongoing symptoms. Utilized virtual anatomy kailey for visualization of key musculature. Discussed impact of teres major on thoracic/GH on radial nerve and distal N/T. Patient verbalized understanding.    Manual Therapy (59404): 8 minutes   To improve soft tissue extensibility:  -STM teres major/lats, effleurage/pétrissage/MFR, x 8 min     Current HEP:  Access Code: [email or web address]  URL: [email or web address]  Date: 06/18/2025  Prepared by: Judith Abdi     Exercises  - Doorway Pec Stretch at 60 Elevation  - 1 x daily - 7 x weekly - 3 sets - 30s hold  - Seated Cervical Retraction  - 1 x daily - 7 x weekly - 3 sets - 10 reps - 10s hold  - Standing Shoulder Row with Anchored Resistance  - 1 x daily - 7 x weekly - 3 sets - 10 reps - 3s hold  - Prone W Scapular Retraction  - 1 x daily - 7 x weekly - 3 sets - 10 reps - 5s hold  - Standing Median Nerve Glide  - 1 x daily - 7 x weekly - 3 sets - 10 reps    OP EDUCATION:   Provided education regarding rationale behind interventions performed in the context of ongoing symptoms. Patient verbalized understanding.    Goals:  By discharge,      1.) Patient will demonstrate independence with HEP to promote symptom relief and facilitate return to prior level of function.  2.) Patient will report decrease in pain by 2 points to meet the MCID.  3.) Score on NDI will improve to >7.5 points to meet the MCID and indicate return to typical function relative to improved neck pain.   4.) Patient  will demonstrate ability to perform DNF endurance test for >30s w/out compensation to promote muscular stability necessary for pain relief and return to PLOF.  5.) Patient will demonstrate improvement in cervical L LAT flexion and rotation AROM to within 5 deg of unaffected side to facilitate mobility necessary for I/ADLs, mobility tasks, pain relief.   6.) Patient will demonstrate pain-free shoulder mobility in all directions to facilitate mobility necessary for I/ADL and work-related tasks.   7.) Patient will report a reduction in hand tingling frequency and intensity by >75% to facilitate return to PLOF.

## 2025-07-22 ENCOUNTER — APPOINTMENT (OUTPATIENT)
Dept: PHYSICAL THERAPY | Facility: CLINIC | Age: 35
End: 2025-07-22
Payer: COMMERCIAL

## 2025-07-22 DIAGNOSIS — M54.2 CERVICALGIA: Primary | ICD-10-CM

## 2025-07-22 DIAGNOSIS — M54.12 CERVICAL RADICULOPATHY: ICD-10-CM

## 2025-08-01 ENCOUNTER — DOCUMENTATION (OUTPATIENT)
Dept: PHYSICAL THERAPY | Facility: CLINIC | Age: 35
End: 2025-08-01
Payer: COMMERCIAL

## 2025-08-01 DIAGNOSIS — M54.2 CERVICALGIA: Primary | ICD-10-CM

## 2025-08-01 DIAGNOSIS — M54.12 CERVICAL RADICULOPATHY: ICD-10-CM

## 2025-08-01 NOTE — PROGRESS NOTES
Physical Therapy    Discharge Summary    Name: Jayme Lackey  MRN: [id number]  : 1990  Date: 25    Discharge Summary: PT    Discharge Information: Date of discharge 2025, Date of last visit 2025, Date of evaluation 2025, and Number of attended visits 3    Therapy Summary: Patient referred to PT for cervical radiculopathy w/ great progress towards goals. Patient independent with HEP and messaged to report improvements and ability to continue working on own.     Rehab Discharge Reason: Achieved all and/or the most significant goals(s)